# Patient Record
Sex: MALE | Race: WHITE | NOT HISPANIC OR LATINO | Employment: OTHER | URBAN - METROPOLITAN AREA
[De-identification: names, ages, dates, MRNs, and addresses within clinical notes are randomized per-mention and may not be internally consistent; named-entity substitution may affect disease eponyms.]

---

## 2018-08-09 ENCOUNTER — OFFICE VISIT (OUTPATIENT)
Dept: NEUROSURGERY | Facility: CLINIC | Age: 33
End: 2018-08-09

## 2018-08-09 VITALS
TEMPERATURE: 97.1 F | OXYGEN SATURATION: 93 % | SYSTOLIC BLOOD PRESSURE: 120 MMHG | HEART RATE: 50 BPM | WEIGHT: 227 LBS | HEIGHT: 72 IN | DIASTOLIC BLOOD PRESSURE: 72 MMHG | BODY MASS INDEX: 30.75 KG/M2

## 2018-08-09 DIAGNOSIS — G89.29 CHRONIC BILATERAL LOW BACK PAIN WITHOUT SCIATICA: Primary | ICD-10-CM

## 2018-08-09 DIAGNOSIS — Z98.890 S/P LUMBAR DISCECTOMY: ICD-10-CM

## 2018-08-09 DIAGNOSIS — M54.50 CHRONIC BILATERAL LOW BACK PAIN WITHOUT SCIATICA: Primary | ICD-10-CM

## 2018-08-09 DIAGNOSIS — M47.817 LUMBOSACRAL SPONDYLOSIS WITHOUT MYELOPATHY: ICD-10-CM

## 2018-08-09 PROCEDURE — 99244 OFF/OP CNSLTJ NEW/EST MOD 40: CPT | Performed by: NEUROLOGICAL SURGERY

## 2018-08-09 RX ORDER — GABAPENTIN 800 MG/1
1 TABLET ORAL 3 TIMES DAILY
COMMUNITY
Start: 2018-07-25

## 2018-08-09 RX ORDER — TIZANIDINE 4 MG/1
4 TABLET ORAL EVERY 8 HOURS PRN
COMMUNITY
Start: 2018-07-17

## 2018-08-09 RX ORDER — RANITIDINE 300 MG/1
300 TABLET ORAL NIGHTLY PRN
COMMUNITY
Start: 2018-07-17

## 2018-08-09 RX ORDER — BRIMONIDINE TARTRATE 2 MG/ML
1 SOLUTION/ DROPS OPHTHALMIC 3 TIMES DAILY
COMMUNITY
Start: 2018-07-17

## 2018-08-09 RX ORDER — FLUTICASONE PROPIONATE 50 UG/1
1 SPRAY, METERED NASAL DAILY PRN
COMMUNITY
Start: 2018-07-17

## 2018-08-09 RX ORDER — PANTOPRAZOLE SODIUM 40 MG/1
40 TABLET, DELAYED RELEASE ORAL 2 TIMES DAILY
COMMUNITY
Start: 2018-07-17

## 2018-08-09 RX ORDER — LORATADINE 10 MG/1
10 TABLET ORAL DAILY
COMMUNITY
Start: 2018-07-17

## 2018-08-09 RX ORDER — TRAZODONE HYDROCHLORIDE 50 MG/1
50 TABLET ORAL NIGHTLY
COMMUNITY
Start: 2018-07-17

## 2018-08-09 RX ORDER — RIZATRIPTAN BENZOATE 5 MG/1
5 TABLET ORAL ONCE AS NEEDED
COMMUNITY
Start: 2018-07-17

## 2018-08-09 RX ORDER — MELOXICAM 15 MG/1
15 TABLET ORAL DAILY
Qty: 30 TABLET | Refills: 3 | Status: SHIPPED | OUTPATIENT
Start: 2018-08-09

## 2018-08-09 RX ORDER — IBUPROFEN 800 MG/1
800 TABLET ORAL EVERY 8 HOURS PRN
COMMUNITY
Start: 2018-07-17 | End: 2018-08-09 | Stop reason: ALTCHOICE

## 2018-08-09 RX ORDER — ESCITALOPRAM OXALATE 20 MG/1
20 TABLET ORAL DAILY
COMMUNITY
Start: 2018-07-17

## 2018-08-09 RX ORDER — SIMVASTATIN 20 MG
20 TABLET ORAL NIGHTLY
COMMUNITY
Start: 2018-07-17

## 2018-08-09 RX ORDER — ERGOCALCIFEROL 1.25 MG/1
50000 CAPSULE ORAL
COMMUNITY
Start: 2018-07-17

## 2018-08-09 RX ORDER — MONTELUKAST SODIUM 10 MG/1
10 TABLET ORAL NIGHTLY
COMMUNITY
Start: 2018-07-17

## 2018-08-09 NOTE — PROGRESS NOTES
Patient: Aaron Grimes  :  1985  Chart #:  [id number]    Date of Service: 18    Chief Complaint: No chief complaint on file.      Back Pain   This is a new (Mr. Grimes is new with me today.  He is a kind 32 year old male who presents today with low back pain and lower extremity pain. ) problem. Episode onset: I have seen Mr. Grimes in the past, however it has been about 4 years now. The problem occurs daily. The problem has been gradually worsening since onset. The pain is present in the lumbar spine. The quality of the pain is described as aching. The pain radiates to the left thigh and right thigh (He has pain that runs down the back of his lower extremities to the popliteal region.). 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 The pain is mild (His pain is mild to moderate.  He states that after working for a short time he will have to sit down because of the LBP.). Worse during: He has pain when his lower extremities are raised.  The right is worse than the left. The symptoms are aggravated by lying down, standing and position (He is not able to lay on his stomach.  He states that he gets stiff and locked up.). Stiffness is present in the morning. (He has had a left L4L5 hemilaminectomy and discectomy in .  Walking does not cause leg pain and walking is not limited.  ) Risk factors: He has decreased sensation in his RLE. He has tried analgesics, walking and NSAIDs (He takes ibuprofen 800 mg about twice daily and Neurontin for his pain. ) for the symptoms. The treatment provided mild relief.     Radiographic Images:   MRI of the lumbar spine dated 18 shows he has normal alignment of the lumbar spine.  He has dessication of the L2-L3, L4-L5 and L5-S1 discs.  He has type II modic changes at L4L5.  There is no central or lateral recess stenosis, and there is no disc herniation.  He has had a left L4L5 hemilaminectomy and discectomy.  He  has a generally narrow spinal canal.      Past Medical History:   Diagnosis Date   • Headache      Current Outpatient Prescriptions   Medication Sig Dispense Refill   • brimonidine (ALPHAGAN) 0.2 % ophthalmic solution 1 drop 3 (Three) Times a Day.     • escitalopram (LEXAPRO) 20 MG tablet 20 mg Daily.     • gabapentin (NEURONTIN) 800 MG tablet Take 1 tablet by mouth 3 (Three) Times a Day.     • GNP FLUTICASONE PROPIONATE 50 MCG/ACT nasal spray 1 spray into the nostril(s) as directed by provider Daily As Needed.     • loratadine (CLARITIN) 10 MG tablet Take 10 mg by mouth Daily.     • montelukast (SINGULAIR) 10 MG tablet Take 10 mg by mouth Every Night.     • pantoprazole (PROTONIX) 40 MG EC tablet Take 40 mg by mouth 2 (Two) Times a Day.     • raNITIdine (ZANTAC) 300 MG tablet Take 300 mg by mouth At Night As Needed.     • rizatriptan (MAXALT) 5 MG tablet Take 5 mg by mouth 1 (One) Time As Needed.     • simvastatin (ZOCOR) 20 MG tablet Take 20 mg by mouth Every Night.     • tiZANidine (ZANAFLEX) 4 MG tablet Take 4 mg by mouth Every 8 (Eight) Hours As Needed for Muscle Spasms.     • traZODone (DESYREL) 50 MG tablet Take 50 mg by mouth Every Night.     • vitamin D (ERGOCALCIFEROL) 29849 units capsule capsule Take 50,000 Units by mouth Every 7 (Seven) Days.       No current facility-administered medications for this visit.       Allergies   Allergen Reactions   • Lortab [Hydrocodone-Acetaminophen] Itching and Nausea And Vomiting     Social History     Social History   • Marital status:      Social History Main Topics   • Smoking status: Current Every Day Smoker     Packs/day: 1.00     Types: Cigarettes   • Smokeless tobacco: Never Used   • Alcohol use No   • Drug use: No   • Sexual activity: Defer     Other Topics Concern   • Not on file     Family History   Problem Relation Age of Onset   • Diabetes Father    • Cancer Father    • Cancer Maternal Uncle    • Heart disease Maternal Grandmother    • Kidney disease  "Maternal Grandmother    • Kidney disease Paternal Grandmother    • Heart disease Paternal Grandmother      Past Surgical History:   Procedure Laterality Date   • BACK SURGERY  2014    L4/5 S1 Dr. Prado     Review of Systems   Musculoskeletal: Positive for back pain.   All other systems reviewed and are negative.    Vitals:    08/09/18 1251   BP: 120/72   BP Location: Right arm   Patient Position: Sitting   Pulse: 50   Temp: 97.1 °F (36.2 °C)   TempSrc: Temporal Artery    SpO2: 93%   Weight: 103 kg (227 lb)   Height: 182.9 cm (72\")     Physical Exam  Neurologic Exam  Physical Exam   Constitutional: The patient is oriented to person, place, and time.  The patient appears well-developed and well-nourished and in no distress.   Neat healthy male  HENT:   Head: Normocephalic.   Right Ear: Hearing normal.   Left Ear: Hearing normal.   Mouth/Throat: Uvula is midline, oropharynx is clear and moist and mucous membranes are normal.   Eyes: Conjunctivae, EOM and lids are normal.  Left pupil is round, and reactive to light.  Blind OD.  Fundoscopic exam:  The left eye shows no papilledema.   Pupils 2 mm OS and irregular and fixed OD; Fundi normal   Neck: Trachea normal and normal range of motion. No thyroid mass present.   Cardiovascular: Regular rhythm   Pulses:  Carotid pulses are 2+ on the right side, and 2+ on the left side.  Radial pulses are 2+ on the right side, and 2+ on the left side.   Dorsalis pedis pulses are 1+ on the right side, and 1+ on the left side.   Posterior tibial pulses are 2+ on the right side and 2+ on the left side.  Pulmonary/Chest: Effort normal and breath sounds normal.   Musculoskeletal:   Lumbar back: The patient exhibits pain with movement. The patient exhibits normal range of motion, no deformity and no spasm.   Healed lumbar incision.  Mild stiffness; SLR increased low back and right leg pain; Hip ROM normal        Neurologic Exam      Mental Status   Oriented to person, place, and time. "   Attention: normal. Concentration: normal.   Speech: speech is normal   Level of consciousness: alert  Knowledge: good and consistent with education.   Normal comprehension.      Cranial Nerves   Cranial nerves II through XII intact except for loss of vision OD     Motor Exam   Muscle bulk: normal  Overall muscle tone: normal  No Pronator Drift    Strength   Strength 5/5 throughout.      Sensory Exam   Light touch normal except slight hypesthesia on dorsolateral right foot.  Proprioception normal.      Gait, Coordination, and Reflexes      Gait: normal     Tremor   Resting tremor: absent  Intention tremor: absent  Action tremor: absent     Reflexes   Right biceps: 1+  Left biceps: 1+  Right triceps: 1+  Left triceps: 1+  Right patellar: 2+  Left patellar: 2+  Right achilles: 2+  Left achilles: 2+  No Babinski signs  Right Byrnes: absent  Left Byrnes: absent  Right ankle clonus: absent  Left ankle clonus: absent  JOSUÉ normal; R handed      Diagnoses and all orders for this visit:    Chronic bilateral low back pain without sciatica  -     meloxicam (MOBIC) 15 MG tablet; Take 1 tablet by mouth Daily. With food    Lumbosacral spondylosis without myelopathy  -     meloxicam (MOBIC) 15 MG tablet; Take 1 tablet by mouth Daily. With food    S/P lumbar discectomy  Comments:  Left L4L5      Plan:  Stop ibuprofen;  Prescribe meloxicam 15 mg po daily with food;  Daily exercise;  Avoid abuse of low back;  I do not see an indication for spine surgery at this time.  He is to return for re-evaluation prn if new symptoms arise.      I, Dr. Vlad Prado, personally performed the services described in the documentation as scribed in my presence, and the documentation is both accurate and complete.    Vlad Prado MD

## 2019-10-03 ENCOUNTER — OFFICE VISIT (OUTPATIENT)
Dept: NEUROSURGERY | Facility: CLINIC | Age: 34
End: 2019-10-03

## 2019-10-03 VITALS — BODY MASS INDEX: 30.79 KG/M2 | HEIGHT: 72 IN

## 2019-10-03 DIAGNOSIS — M51.36 BULGING LUMBAR DISC: Primary | ICD-10-CM

## 2019-10-03 PROCEDURE — 99213 OFFICE O/P EST LOW 20 MIN: CPT | Performed by: NEUROLOGICAL SURGERY

## 2019-10-03 NOTE — PROGRESS NOTES
"Subjective   Aaron Grimes is a 33 y.o. male who presents for follow up of right hip and leg pain.     The patient is a 33-year-old man from [facility] who has a history of pain in the right hip and leg began about 2 or 3 months ago.  He describes it as \"very horrible\" with pain extending in a sciatic pattern down his leg to his right ankle.  Dr. Vlad Prado did a left L4-5 discectomy about 5 years ago.  He has had a chronic back pain ever since and saw Dr. Prado last a year ago before his longterm.    Lumbar MRI scan done on 5/20/2018 showed a disc bulge on the left at L5-S1.    The following portions of the patient's history were reviewed, updated as appropriate and approved: allergies, current medications, past family history, past medical history, past social history, past surgical history, review of systems and problem list.     Review of Systems   Constitutional: Negative for activity change, appetite change, chills, diaphoresis, fatigue, fever and unexpected weight change.   HENT: Negative for congestion, dental problem, drooling, ear discharge, ear pain, facial swelling, hearing loss, mouth sores, nosebleeds, postnasal drip, rhinorrhea, sinus pressure, sneezing, sore throat, tinnitus, trouble swallowing and voice change.    Eyes: Negative for photophobia, pain, discharge, redness, itching and visual disturbance.   Respiratory: Negative for apnea, cough, choking, chest tightness, shortness of breath, wheezing and stridor.    Cardiovascular: Negative for chest pain, palpitations and leg swelling.   Gastrointestinal: Negative for abdominal distention, abdominal pain, anal bleeding, blood in stool, constipation, diarrhea, nausea, rectal pain and vomiting.   Endocrine: Negative for cold intolerance, heat intolerance, polydipsia, polyphagia and polyuria.   Genitourinary: Negative for decreased urine volume, difficulty urinating, dysuria, enuresis, flank pain, frequency, genital sores, hematuria and urgency. "   Musculoskeletal: Positive for back pain. Negative for arthralgias, gait problem, joint swelling, myalgias, neck pain and neck stiffness.   Skin: Negative for color change, pallor, rash and wound.   Allergic/Immunologic: Negative for environmental allergies, food allergies and immunocompromised state.   Neurological: Negative for dizziness, tremors, seizures, syncope, facial asymmetry, speech difficulty, weakness, light-headedness, numbness and headaches.   Hematological: Negative for adenopathy. Does not bruise/bleed easily.   Psychiatric/Behavioral: Negative for agitation, behavioral problems, confusion, decreased concentration, dysphoric mood, hallucinations, self-injury, sleep disturbance and suicidal ideas. The patient is not nervous/anxious and is not hyperactive.    All other systems reviewed and are negative.      Objective    NEUROLOGICAL EXAMINATION:    SLR positive right 40 degrees.  Negative left.  No motor or sensory loss in the right leg.  2+ and equal ankle jerks bilaterally.    Assessment   Possible disc herniation right L5-S1, versus L4-5.       Plan   Physical therapy.  Lumbar myelogram.  Follow-up in the office after myelography.       Ramy Crespo MD

## 2019-10-08 ENCOUNTER — HOSPITAL ENCOUNTER (OUTPATIENT)
Facility: HOSPITAL | Age: 34
Setting detail: HOSPITAL OUTPATIENT SURGERY
End: 2019-10-08
Attending: RADIOLOGY | Admitting: RADIOLOGY

## 2019-10-08 DIAGNOSIS — M51.36 BULGING LUMBAR DISC: ICD-10-CM

## 2019-10-09 ENCOUNTER — DOCUMENTATION (OUTPATIENT)
Dept: NEUROSURGERY | Facility: CLINIC | Age: 34
End: 2019-10-09

## 2019-10-09 NOTE — PROGRESS NOTES
Patient will be scheduled for a lumbar myelogram with post myelogram CT imaging.  Based upon his current symptoms including pain in his right lower extremity which extends to the ankle accompanied by positive straight leg raise on the right, additional imaging is warranted.  Myelogram as well as post myelography CT to determine L5-S1 versus L4-5 disc herniation to facilitate surgical planning if warranted.